# Patient Record
Sex: FEMALE | Race: WHITE | NOT HISPANIC OR LATINO
[De-identification: names, ages, dates, MRNs, and addresses within clinical notes are randomized per-mention and may not be internally consistent; named-entity substitution may affect disease eponyms.]

---

## 2017-01-10 ENCOUNTER — CHARTING TRANS (OUTPATIENT)
Dept: OTHER | Age: 22
End: 2017-01-10

## 2017-07-05 ENCOUNTER — CHARTING TRANS (OUTPATIENT)
Dept: OTHER | Age: 22
End: 2017-07-05

## 2017-07-05 ENCOUNTER — LAB SERVICES (OUTPATIENT)
Dept: OTHER | Age: 22
End: 2017-07-05

## 2017-07-06 LAB
APPEARANCE: CLEAR
BILIRUBIN: NEGATIVE
COLOR: YELLOW
GLUCOSE U: NEGATIVE
KETONES: NEGATIVE
LEUKOCYTES: NORMAL
NITRITE: NEGATIVE
OCCULT BLOOD: NORMAL
PH: 7
PROTEIN: NEGATIVE
URINE SPEC GRAVITY: 1.02
UROBILINOGEN: 0.2

## 2017-07-07 ENCOUNTER — CHARTING TRANS (OUTPATIENT)
Dept: OTHER | Age: 22
End: 2017-07-07

## 2017-07-07 LAB — BACTERIA UR CULT: NORMAL

## 2018-09-17 PROBLEM — Z02.1 PRE-EMPLOYMENT EXAMINATION: Status: ACTIVE | Noted: 2018-09-17

## 2018-09-17 PROBLEM — Z00.00 PREVENTATIVE HEALTH CARE: Status: ACTIVE | Noted: 2018-09-17

## 2018-09-17 PROBLEM — F41.9 ANXIETY: Status: ACTIVE | Noted: 2018-09-17

## 2018-09-17 PROBLEM — Z01.84 IMMUNITY STATUS TESTING: Status: ACTIVE | Noted: 2018-09-17

## 2018-09-17 PROBLEM — F98.8 ATTENTION DEFICIT DISORDER (ADD) WITHOUT HYPERACTIVITY: Status: ACTIVE | Noted: 2018-09-17

## 2018-09-17 PROBLEM — Z11.1 TUBERCULOSIS SCREENING: Status: ACTIVE | Noted: 2018-09-17

## 2018-09-17 PROBLEM — Z13.1 SCREENING FOR DIABETES MELLITUS: Status: ACTIVE | Noted: 2018-09-17

## 2018-09-17 PROBLEM — Z13.220 SCREENING CHOLESTEROL LEVEL: Status: ACTIVE | Noted: 2018-09-17

## 2018-09-17 PROBLEM — E66.3 OVERWEIGHT (BMI 25.0-29.9): Status: ACTIVE | Noted: 2018-09-17

## 2018-10-19 PROBLEM — Z00.00 PREVENTATIVE HEALTH CARE: Status: RESOLVED | Noted: 2018-09-17 | Resolved: 2018-10-19

## 2018-10-19 PROBLEM — Z13.1 SCREENING FOR DIABETES MELLITUS: Status: RESOLVED | Noted: 2018-09-17 | Resolved: 2018-10-19

## 2018-10-19 PROBLEM — Z02.1 PRE-EMPLOYMENT EXAMINATION: Status: RESOLVED | Noted: 2018-09-17 | Resolved: 2018-10-19

## 2018-10-19 PROBLEM — F98.8 ATTENTION DEFICIT DISORDER (ADD) WITHOUT HYPERACTIVITY: Status: RESOLVED | Noted: 2018-09-17 | Resolved: 2018-10-19

## 2018-10-23 PROBLEM — R05.9 COUGH: Status: ACTIVE | Noted: 2018-10-23

## 2018-10-23 PROBLEM — Z23 NEED FOR DIPHTHERIA-TETANUS-PERTUSSIS (TDAP) VACCINE: Status: ACTIVE | Noted: 2018-10-23

## 2018-10-23 PROBLEM — J01.00 ACUTE NON-RECURRENT MAXILLARY SINUSITIS: Status: ACTIVE | Noted: 2018-10-23

## 2018-10-23 PROBLEM — R45.86 EMOTIONAL LABILITY: Status: ACTIVE | Noted: 2018-10-23

## 2018-11-03 VITALS
HEIGHT: 63 IN | RESPIRATION RATE: 16 BRPM | TEMPERATURE: 98.3 F | HEART RATE: 84 BPM | WEIGHT: 185 LBS | OXYGEN SATURATION: 98 % | SYSTOLIC BLOOD PRESSURE: 122 MMHG | DIASTOLIC BLOOD PRESSURE: 78 MMHG | BODY MASS INDEX: 32.78 KG/M2

## 2018-11-05 VITALS
RESPIRATION RATE: 16 BRPM | DIASTOLIC BLOOD PRESSURE: 82 MMHG | BODY MASS INDEX: 33.84 KG/M2 | SYSTOLIC BLOOD PRESSURE: 138 MMHG | HEIGHT: 63 IN | WEIGHT: 191 LBS

## 2018-11-16 PROBLEM — Z12.4 SCREENING FOR CERVICAL CANCER: Status: ACTIVE | Noted: 2018-11-16

## 2019-02-22 PROBLEM — Z13.228 SCREENING FOR METABOLIC DISORDER: Status: ACTIVE | Noted: 2019-02-22

## 2019-02-22 PROBLEM — Z13.1 SCREENING FOR DIABETES MELLITUS: Status: ACTIVE | Noted: 2019-02-22

## 2019-02-22 PROBLEM — Z13.29 THYROID DISORDER SCREEN: Status: ACTIVE | Noted: 2019-02-22

## 2019-02-22 PROBLEM — Z13.220 SCREENING FOR CHOLESTEROL LEVEL: Status: ACTIVE | Noted: 2019-02-22

## 2019-02-22 PROBLEM — Z00.00 WELLNESS EXAMINATION: Status: ACTIVE | Noted: 2019-02-22

## 2019-03-14 PROCEDURE — 88175 CYTOPATH C/V AUTO FLUID REDO: CPT | Performed by: OBSTETRICS & GYNECOLOGY

## 2019-04-13 PROCEDURE — 87389 HIV-1 AG W/HIV-1&-2 AB AG IA: CPT | Performed by: OBSTETRICS & GYNECOLOGY

## 2019-04-13 PROCEDURE — 81003 URINALYSIS AUTO W/O SCOPE: CPT | Performed by: INTERNAL MEDICINE

## 2019-04-13 PROCEDURE — 86803 HEPATITIS C AB TEST: CPT | Performed by: OBSTETRICS & GYNECOLOGY

## 2019-04-15 ENCOUNTER — OFFICE VISIT (OUTPATIENT)
Dept: FAMILY MEDICINE CLINIC | Facility: CLINIC | Age: 24
End: 2019-04-15
Payer: COMMERCIAL

## 2019-04-15 VITALS
HEART RATE: 95 BPM | TEMPERATURE: 99 F | DIASTOLIC BLOOD PRESSURE: 66 MMHG | RESPIRATION RATE: 14 BRPM | OXYGEN SATURATION: 100 % | SYSTOLIC BLOOD PRESSURE: 112 MMHG

## 2019-04-15 DIAGNOSIS — J02.0 STREP PHARYNGITIS: ICD-10-CM

## 2019-04-15 PROCEDURE — 87880 STREP A ASSAY W/OPTIC: CPT | Performed by: PHYSICIAN ASSISTANT

## 2019-04-15 PROCEDURE — 99203 OFFICE O/P NEW LOW 30 MIN: CPT | Performed by: PHYSICIAN ASSISTANT

## 2019-04-15 RX ORDER — ALPRAZOLAM 0.5 MG/1
0.5 TABLET ORAL NIGHTLY PRN
COMMUNITY
End: 2019-06-03

## 2019-04-15 RX ORDER — AMOXICILLIN 875 MG/1
875 TABLET, COATED ORAL 2 TIMES DAILY
Qty: 20 TABLET | Refills: 0 | Status: SHIPPED | OUTPATIENT
Start: 2019-04-15 | End: 2019-04-25

## 2019-04-15 NOTE — PROGRESS NOTES
CHIEF COMPLAINT:   Patient presents with:  Sore Throat      HPI:   Fani Taylor is a 21year old female presents to clinic with symptoms of sore throat since last night. 99.7 fever. Mild body aches. +headache. B/l ear pain, right worse.  Mild nasal co drooling. NECK: supple, non-tender  LUNGS: clear to auscultation bilaterally, no wheezes or rhonchi. Breathing is non labored. CARDIO: RRR without murmur  LYMPH: no anterior cervical. no submandibular lymphadenopathy.   No posterior cervical or occipital

## 2019-04-15 NOTE — PATIENT INSTRUCTIONS
Contagious until on antibiotic for 24 hours and no fever for 24 hours   Ibuprofen for pain   Rest   Fluids   Please follow up with PCP if no improvement or if symptoms worsen

## 2019-05-06 PROBLEM — Z23 NEED FOR MMR VACCINE: Status: ACTIVE | Noted: 2019-05-06

## 2019-05-06 PROBLEM — Z23 NEED FOR VARICELLA VACCINE: Status: ACTIVE | Noted: 2019-05-06

## 2019-05-16 ENCOUNTER — OFFICE VISIT (OUTPATIENT)
Dept: FAMILY MEDICINE CLINIC | Facility: CLINIC | Age: 24
End: 2019-05-16
Payer: COMMERCIAL

## 2019-05-16 VITALS
DIASTOLIC BLOOD PRESSURE: 70 MMHG | OXYGEN SATURATION: 98 % | HEART RATE: 70 BPM | HEIGHT: 62.5 IN | SYSTOLIC BLOOD PRESSURE: 112 MMHG | TEMPERATURE: 98 F | BODY MASS INDEX: 32.83 KG/M2 | WEIGHT: 183 LBS | RESPIRATION RATE: 16 BRPM

## 2019-05-16 DIAGNOSIS — J02.9 ACUTE PHARYNGITIS, UNSPECIFIED ETIOLOGY: Primary | ICD-10-CM

## 2019-05-16 PROCEDURE — 87081 CULTURE SCREEN ONLY: CPT | Performed by: NURSE PRACTITIONER

## 2019-05-16 PROCEDURE — 87880 STREP A ASSAY W/OPTIC: CPT | Performed by: NURSE PRACTITIONER

## 2019-05-16 PROCEDURE — 99213 OFFICE O/P EST LOW 20 MIN: CPT | Performed by: NURSE PRACTITIONER

## 2019-05-16 NOTE — PROGRESS NOTES
CHIEF COMPLAINT:   Patient presents with:  Sore Throat  Ear Pain        HPI:   Silvano Herndon is a 21year old female presents to clinic with complaint of sore throat. Patient has had for 2-3 days.    Patient reports following symptoms: mild nasal con EARS: TM's clear, non-injected, no bulging, retraction, or fluid bilaterally  NOSE: nostrils patent, clear nasal mucus, nasal mucosa pink with mild swelling  THROAT: oral mucosa pink, moist. Posterior pharynx erythematous and injected. No exudates.  Tonsils Throat Coat Tea may be helpful. Pharyngitis (Sore Throat), Report Pending    Pharyngitis (sore throat) is often due to a virus. It can also be caused by streptococcus (strep), bacteria. This is often called strep throat.  Both viral and strep infection · Use throat lozenges or numbing throat sprays to help reduce pain. Gargling with warm salt water will also help reduce throat pain. Dissolve 1/2 teaspoon of salt in 1 glass of warm water. Children can sip on juice or a popsicle.  Children 5 years and older · •Can’t swallow liquids, a lot of drooling, or can’t open mouth wide due to throat pain  · Signs of dehydration, such as very dark urine or no urine, sunken eyes, dizziness  · Trouble breathing or noisy breathing  · Muffled voice  · New rash  · Other symp

## 2019-05-16 NOTE — PATIENT INSTRUCTIONS
Gargle with warm salt water  Throat Coat Tea may be helpful. Pharyngitis (Sore Throat), Report Pending    Pharyngitis (sore throat) is often due to a virus. It can also be caused by streptococcus (strep), bacteria. This is often called strep throat.  B 1 glass of warm water. Children can sip on juice or a popsicle. Children 5 years and older can also suck on a lollipop or hard candy. · Don't eat salty or spicy foods or give them to your child. These can irritate the throat.   Other medicine for a child: symptoms getting worse  Prevention  Here are steps you can take to help prevent an infection:  · Keep good hand washing habits. · Don’t have close contact with people who have sore throats, colds, or other upper respiratory infections.   · Don’t smoke, and

## 2019-05-18 ENCOUNTER — TELEPHONE (OUTPATIENT)
Dept: FAMILY MEDICINE CLINIC | Facility: CLINIC | Age: 24
End: 2019-05-18

## 2019-06-03 PROBLEM — Z23 NEED FOR HPV VACCINATION: Status: ACTIVE | Noted: 2019-06-03

## 2019-08-05 PROBLEM — Z01.84 IMMUNITY STATUS TESTING: Status: RESOLVED | Noted: 2018-09-17 | Resolved: 2019-08-05

## 2019-08-05 PROBLEM — Z13.29 THYROID DISORDER SCREEN: Status: RESOLVED | Noted: 2019-02-22 | Resolved: 2019-08-05

## 2019-08-05 PROBLEM — J01.00 ACUTE NON-RECURRENT MAXILLARY SINUSITIS: Status: RESOLVED | Noted: 2018-10-23 | Resolved: 2019-08-05

## 2019-08-05 PROBLEM — Z13.220 SCREENING FOR CHOLESTEROL LEVEL: Status: RESOLVED | Noted: 2019-02-22 | Resolved: 2019-08-05

## 2019-08-05 PROBLEM — Z23 NEED FOR MMR VACCINE: Status: RESOLVED | Noted: 2019-05-06 | Resolved: 2019-08-05

## 2019-08-05 PROBLEM — Z23 NEED FOR DIPHTHERIA-TETANUS-PERTUSSIS (TDAP) VACCINE: Status: RESOLVED | Noted: 2018-10-23 | Resolved: 2019-08-05

## 2019-08-05 PROBLEM — Z23 NEED FOR VARICELLA VACCINE: Status: RESOLVED | Noted: 2019-05-06 | Resolved: 2019-08-05

## 2019-08-05 PROBLEM — R05.9 COUGH: Status: RESOLVED | Noted: 2018-10-23 | Resolved: 2019-08-05

## 2019-08-05 PROBLEM — Z00.00 WELLNESS EXAMINATION: Status: RESOLVED | Noted: 2019-02-22 | Resolved: 2019-08-05

## 2019-08-05 PROBLEM — Z13.228 SCREENING FOR METABOLIC DISORDER: Status: RESOLVED | Noted: 2019-02-22 | Resolved: 2019-08-05

## 2019-08-05 PROBLEM — Z11.1 TUBERCULOSIS SCREENING: Status: RESOLVED | Noted: 2018-09-17 | Resolved: 2019-08-05

## 2019-08-05 PROBLEM — Z13.1 SCREENING FOR DIABETES MELLITUS: Status: RESOLVED | Noted: 2019-02-22 | Resolved: 2019-08-05

## 2019-08-05 PROBLEM — Z12.4 SCREENING FOR CERVICAL CANCER: Status: RESOLVED | Noted: 2018-11-16 | Resolved: 2019-08-05

## 2019-08-05 PROBLEM — Z23 NEED FOR VACCINATION: Status: ACTIVE | Noted: 2019-06-03

## 2019-12-04 PROBLEM — M54.2 NECK PAIN: Status: ACTIVE | Noted: 2019-12-04

## 2019-12-04 PROBLEM — E66.9 OBESITY (BMI 35.0-39.9 WITHOUT COMORBIDITY): Status: ACTIVE | Noted: 2019-12-04

## 2019-12-04 PROBLEM — M62.830 BACK MUSCLE SPASM: Status: ACTIVE | Noted: 2019-12-04

## 2019-12-04 PROBLEM — Z23 NEED FOR HPV VACCINATION: Status: ACTIVE | Noted: 2019-12-04

## 2019-12-04 PROBLEM — M54.9 MID BACK PAIN: Status: ACTIVE | Noted: 2019-12-04

## 2020-11-13 PROBLEM — Z11.9 ENCOUNTER FOR SCREENING EXAMINATION FOR INFECTIOUS DISEASE: Status: ACTIVE | Noted: 2020-11-13

## 2020-11-13 PROBLEM — Z20.822 ENCOUNTER FOR SCREENING LABORATORY TESTING FOR COVID-19 VIRUS: Status: ACTIVE | Noted: 2020-11-13

## 2020-11-13 PROBLEM — Z20.822 EXPOSURE TO COVID-19 VIRUS: Status: ACTIVE | Noted: 2020-11-13

## 2020-12-29 PROBLEM — Z13.228 SCREENING FOR METABOLIC DISORDER: Status: ACTIVE | Noted: 2020-12-29

## 2020-12-29 PROBLEM — F07.81 POSTCONCUSSION SYNDROME: Status: ACTIVE | Noted: 2020-12-29

## 2020-12-29 PROBLEM — G44.209 TENSION HEADACHE: Status: ACTIVE | Noted: 2020-12-29

## 2020-12-29 PROBLEM — E66.9 CLASS 2 OBESITY WITHOUT SERIOUS COMORBIDITY WITH BODY MASS INDEX (BMI) OF 37.0 TO 37.9 IN ADULT, UNSPECIFIED OBESITY TYPE: Status: ACTIVE | Noted: 2020-12-29

## 2020-12-29 PROBLEM — R55 VASOVAGAL SYNCOPE: Status: ACTIVE | Noted: 2020-12-29

## 2021-05-04 PROBLEM — G47.09 OTHER INSOMNIA: Status: ACTIVE | Noted: 2021-05-04

## (undated) NOTE — LETTER
Date: 4/15/2019    Patient Name: Ladarius Vlalecillo          To Whom it may concern: This letter has been written at the patient's request. The above patient was seen at the [facility] for treatment of a medical condition.     This patient